# Patient Record
Sex: MALE | Race: BLACK OR AFRICAN AMERICAN | NOT HISPANIC OR LATINO | URBAN - METROPOLITAN AREA
[De-identification: names, ages, dates, MRNs, and addresses within clinical notes are randomized per-mention and may not be internally consistent; named-entity substitution may affect disease eponyms.]

---

## 2022-06-26 ENCOUNTER — EMERGENCY (EMERGENCY)
Facility: HOSPITAL | Age: 67
LOS: 1 days | Discharge: HOME | End: 2022-06-26
Attending: EMERGENCY MEDICINE
Payer: MEDICARE

## 2022-06-26 VITALS
RESPIRATION RATE: 16 BRPM | TEMPERATURE: 98 F | HEART RATE: 88 BPM | DIASTOLIC BLOOD PRESSURE: 89 MMHG | SYSTOLIC BLOOD PRESSURE: 148 MMHG | OXYGEN SATURATION: 96 %

## 2022-06-26 VITALS
OXYGEN SATURATION: 96 % | RESPIRATION RATE: 18 BRPM | TEMPERATURE: 98 F | HEIGHT: 70 IN | WEIGHT: 289.91 LBS | DIASTOLIC BLOOD PRESSURE: 101 MMHG | SYSTOLIC BLOOD PRESSURE: 176 MMHG | HEART RATE: 98 BPM

## 2022-06-26 PROCEDURE — 99285 EMERGENCY DEPT VISIT HI MDM: CPT

## 2022-06-26 PROCEDURE — 99283 EMERGENCY DEPT VISIT LOW MDM: CPT
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
## 2022-06-26 NOTE — ED PROVIDER NOTE - NS ED ROS FT
Review of Systems:  -General: no fever or chills  -ENT: no congestion, no difficulty swallowing  -Pulmonary: no cough, no shortness of breath  -Cardiac: no chest pain, no palpitations  -Gastrointestinal: no abdominal pain, no nausea, no vomiting, and no diarrhea.  -Genitourinary: no blood or pain with urination  -Musculoskeletal: no back or neck pain  -Skin: no rashes  -Endocrine: No h/o diabetes  -Neurologic: No new weakness or numbness in extremities    All else negative unless otherwise specified elsewhere in this note.

## 2022-06-26 NOTE — ED PROVIDER NOTE - CHIEF COMPLAINT
The patient is a 66y Male complaining of psychiatric evaluation. The patient is a 66y Male presenting to the ED asking for assistance with a safe place to live.
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
## 2022-06-26 NOTE — ED PROVIDER NOTE - CLINICAL SUMMARY MEDICAL DECISION MAKING FREE TEXT BOX
No acute medical or psychiatric issue. Contacted social work and obtained information for subsidized housing and [facility] emergency contact information for shelter assignment/housing.  Patient understands plan for discharge, and is agreeable. No acute medical or psychiatric issue. Contacted social work and obtained information for subsidized housing and [facility] emergency contact information for shelter assignment/housing.  Patient understands plan for discharge, and is agreeable. No acute medical or psychiatric issue. Contacted social work and obtained information for subsidized housing and [facility] emergency contact information for shelter assignment/housing.  Patient understands plan for discharge, and is agreeable.
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
## 2022-06-26 NOTE — ED PROVIDER NOTE - PHYSICAL EXAMINATION
On Physical Exam:  General: well appearing, in NAD, speaking clearly in full sentences and without difficulty; cooperative with exam  HEENT: anicteric sclera, airway patent  Neck: no JVD, no nuchal rigidity  Cardiac: normal s1, s2; RRR; no MGR  Lungs: CTABL  Abdomen: soft nontender/nondistended  Skin: intact, no rash  Extremities: no peripheral edema, no gross deformities  Psych: normal mood/affect, calm/cooperative, answers questions appropriately, does not appear to be responding to internal stimuli

## 2022-06-26 NOTE — ED PROVIDER NOTE - PATIENT PORTAL LINK FT
You can access the FollowMyHealth Patient Portal offered by [facility] by registering at the following website: [email or web address]. By joining [employer]’s FollowMyHealth portal, you will also be able to view your health information using other applications (apps) compatible with our system. You can access the FollowMyHealth Patient Portal offered by [facility] by registering at the following website: [email or web address]. By joining [employer]’s FollowMyHealth portal, you will also be able to view your health information using other applications (apps) compatible with our system. You can access the FollowMyHealth Patient Portal offered by [facility] by registering at the following website: [email or web address]. By joining [employer]’s FollowMyHealth portal, you will also be able to view your health information using other applications (apps) compatible with our system.

## 2022-06-26 NOTE — ED ADULT NURSE NOTE - NSIMPLEMENTINTERV_GEN_ALL_ED
Implemented All Universal Safety Interventions:  [place] to call system. Call bell, personal items and telephone within reach. Instruct patient to call for assistance. Room bathroom lighting operational. Non-slip footwear when patient is off stretcher. Physically safe environment: no spills, clutter or unnecessary equipment. Stretcher in lowest position, wheels locked, appropriate side rails in place. Implemented All Universal Safety Interventions:  [place] to call system. Call bell, personal items and telephone within reach. Instruct patient to call for assistance. Room bathroom lighting operational. Non-slip footwear when patient is off stretcher. Physically safe environment: no spills, clutter or unnecessary equipment. Stretcher in lowest position, wheels locked, appropriate side rails in place. Implemented All Universal Safety Interventions:  [place] to call system. Call bell, personal items and telephone within reach. Instruct patient to call for assistance. Room bathroom lighting operational. Non-slip footwear when patient is off stretcher. Physically safe environment: no spills, clutter or unnecessary equipment. Stretcher in lowest position, wheels locked, appropriate side rails in place.

## 2022-07-08 PROBLEM — Z00.00 ENCOUNTER FOR PREVENTIVE HEALTH EXAMINATION: Status: ACTIVE | Noted: 2022-07-08

## 2022-07-20 ENCOUNTER — APPOINTMENT (OUTPATIENT)
Dept: FAMILY MEDICINE | Facility: CLINIC | Age: 67
End: 2022-07-20

## 2022-08-04 ENCOUNTER — APPOINTMENT (OUTPATIENT)
Dept: FAMILY MEDICINE | Facility: CLINIC | Age: 67
End: 2022-08-04

## 2022-08-09 ENCOUNTER — NON-APPOINTMENT (OUTPATIENT)
Age: 67
End: 2022-08-09

## 2022-08-09 ENCOUNTER — APPOINTMENT (OUTPATIENT)
Dept: FAMILY MEDICINE | Facility: CLINIC | Age: 67
End: 2022-08-09

## 2022-08-09 VITALS
WEIGHT: 268.13 LBS | TEMPERATURE: 97.3 F | SYSTOLIC BLOOD PRESSURE: 150 MMHG | DIASTOLIC BLOOD PRESSURE: 32 MMHG | OXYGEN SATURATION: 95 % | HEIGHT: 77 IN | HEART RATE: 75 BPM | BODY MASS INDEX: 31.66 KG/M2

## 2022-08-09 DIAGNOSIS — A04.8 OTHER SPECIFIED BACTERIAL INTESTINAL INFECTIONS: ICD-10-CM

## 2022-08-09 DIAGNOSIS — H91.90 UNSPECIFIED HEARING LOSS, UNSPECIFIED EAR: ICD-10-CM

## 2022-08-09 DIAGNOSIS — I82.409 ACUTE EMBOLISM AND THROMBOSIS OF UNSPECIFIED DEEP VEINS OF UNSPECIFIED LOWER EXTREMITY: ICD-10-CM

## 2022-08-09 DIAGNOSIS — I10 ESSENTIAL (PRIMARY) HYPERTENSION: ICD-10-CM

## 2022-08-09 PROCEDURE — 99204 OFFICE O/P NEW MOD 45 MIN: CPT | Mod: 25

## 2022-08-09 PROCEDURE — 93000 ELECTROCARDIOGRAM COMPLETE: CPT

## 2022-08-09 RX ORDER — LOSARTAN POTASSIUM AND HYDROCHLOROTHIAZIDE 12.5; 1 MG/1; MG/1
100-12.5 TABLET ORAL
Qty: 90 | Refills: 1 | Status: ACTIVE | COMMUNITY
Start: 1900-01-01 | End: 1900-01-01

## 2022-08-09 RX ORDER — PANTOPRAZOLE 40 MG/1
40 TABLET, DELAYED RELEASE ORAL
Refills: 0 | Status: ACTIVE | COMMUNITY

## 2022-08-09 RX ORDER — RIVAROXABAN 20 MG/1
20 TABLET, FILM COATED ORAL DAILY
Qty: 1 | Refills: 0 | Status: ACTIVE | COMMUNITY
Start: 1900-01-01 | End: 1900-01-01

## 2022-08-09 RX ORDER — AMOXICILLIN 500 MG/1
500 TABLET, FILM COATED ORAL
Refills: 0 | Status: ACTIVE | COMMUNITY

## 2022-08-09 RX ORDER — CHLORHEXIDINE GLUCONATE 4 %
1000 LIQUID (ML) TOPICAL
Refills: 0 | Status: ACTIVE | COMMUNITY

## 2022-08-09 RX ORDER — RIVAROXABAN 15 MG/1
15 TABLET, FILM COATED ORAL
Refills: 0 | Status: DISCONTINUED | COMMUNITY
End: 2022-08-09

## 2022-08-09 RX ORDER — CLARITHROMYCIN 500 MG/1
500 TABLET, FILM COATED ORAL
Refills: 0 | Status: ACTIVE | COMMUNITY

## 2022-08-09 NOTE — ASSESSMENT
[FreeTextEntry1] : Reviewed hospital records \par After discussed pt needs office [place] due to transportation issues. \par will give # for primary care at [facility] for patient to make followup appt

## 2022-08-09 NOTE — PHYSICAL EXAM
[Normal] : normal rate, regular rhythm, normal S1 and S2 and no murmur heard [de-identified] : hard of hearing

## 2022-08-09 NOTE — HISTORY OF PRESENT ILLNESS
[de-identified] : Pt presenting to establish care. \par Pt was seen at [facility] on 7/13/22\par Was diagnosed with Right leg DVT-> started on Xarelto 20 mg \par Needs to see CArdio \par \par Diagnosed with H pylori - on  abx \par Works for [employer]-> retired from there \par \par HTN:\par doing well\par needs refill of medication\par

## 2023-01-19 ENCOUNTER — EMERGENCY (EMERGENCY)
Facility: HOSPITAL | Age: 68
LOS: 1 days | Discharge: HOME | End: 2023-01-19
Attending: STUDENT IN AN ORGANIZED HEALTH CARE EDUCATION/TRAINING PROGRAM
Payer: MEDICARE

## 2023-01-19 VITALS
OXYGEN SATURATION: 97 % | HEART RATE: 79 BPM | SYSTOLIC BLOOD PRESSURE: 199 MMHG | DIASTOLIC BLOOD PRESSURE: 101 MMHG | RESPIRATION RATE: 18 BRPM | TEMPERATURE: 98 F

## 2023-01-19 LAB
ALBUMIN SERPL ELPH-MCNC: 3.8 G/DL — SIGNIFICANT CHANGE UP (ref 3.3–5.2)
ALP SERPL-CCNC: 97 U/L — SIGNIFICANT CHANGE UP (ref 40–120)
ALT FLD-CCNC: 21 U/L — SIGNIFICANT CHANGE UP
AMMONIA BLD-MCNC: 52 UMOL/L — SIGNIFICANT CHANGE UP (ref 11–55)
AMPHET UR-MCNC: NEGATIVE — SIGNIFICANT CHANGE UP
ANION GAP SERPL CALC-SCNC: 10 MMOL/L — SIGNIFICANT CHANGE UP (ref 5–17)
APAP SERPL-MCNC: <3 UG/ML — LOW (ref 10–26)
APPEARANCE UR: CLEAR — SIGNIFICANT CHANGE UP
APTT BLD: 33.4 SEC — SIGNIFICANT CHANGE UP (ref 27.5–35.5)
AST SERPL-CCNC: 21 U/L — SIGNIFICANT CHANGE UP
BARBITURATES UR SCN-MCNC: NEGATIVE — SIGNIFICANT CHANGE UP
BASOPHILS # BLD AUTO: 0.04 K/UL — SIGNIFICANT CHANGE UP (ref 0–0.2)
BASOPHILS NFR BLD AUTO: 0.5 % — SIGNIFICANT CHANGE UP (ref 0–2)
BENZODIAZ UR-MCNC: NEGATIVE — SIGNIFICANT CHANGE UP
BILIRUB SERPL-MCNC: 0.6 MG/DL — SIGNIFICANT CHANGE UP (ref 0.4–2)
BILIRUB UR-MCNC: NEGATIVE — SIGNIFICANT CHANGE UP
BUN SERPL-MCNC: 14.2 MG/DL — SIGNIFICANT CHANGE UP (ref 8–20)
CALCIUM SERPL-MCNC: 8.9 MG/DL — SIGNIFICANT CHANGE UP (ref 8.4–10.5)
CHLORIDE SERPL-SCNC: 103 MMOL/L — SIGNIFICANT CHANGE UP (ref 96–108)
CO2 SERPL-SCNC: 24 MMOL/L — SIGNIFICANT CHANGE UP (ref 22–29)
COCAINE METAB.OTHER UR-MCNC: NEGATIVE — SIGNIFICANT CHANGE UP
COLOR SPEC: YELLOW — SIGNIFICANT CHANGE UP
CREAT SERPL-MCNC: 0.67 MG/DL — SIGNIFICANT CHANGE UP (ref 0.5–1.3)
DIFF PNL FLD: NEGATIVE — SIGNIFICANT CHANGE UP
EGFR: 102 ML/MIN/1.73M2 — SIGNIFICANT CHANGE UP
EOSINOPHIL # BLD AUTO: 0.16 K/UL — SIGNIFICANT CHANGE UP (ref 0–0.5)
EOSINOPHIL NFR BLD AUTO: 1.9 % — SIGNIFICANT CHANGE UP (ref 0–6)
ETHANOL SERPL-MCNC: <10 MG/DL — SIGNIFICANT CHANGE UP (ref 0–9)
FLUAV AG NPH QL: SIGNIFICANT CHANGE UP
FLUBV AG NPH QL: SIGNIFICANT CHANGE UP
GLUCOSE SERPL-MCNC: 86 MG/DL — SIGNIFICANT CHANGE UP (ref 70–99)
GLUCOSE UR QL: NEGATIVE MG/DL — SIGNIFICANT CHANGE UP
HCT VFR BLD CALC: 41.3 % — SIGNIFICANT CHANGE UP (ref 39–50)
HGB BLD-MCNC: 13.5 G/DL — SIGNIFICANT CHANGE UP (ref 13–17)
IMM GRANULOCYTES NFR BLD AUTO: 0.2 % — SIGNIFICANT CHANGE UP (ref 0–0.9)
KETONES UR-MCNC: NEGATIVE — SIGNIFICANT CHANGE UP
LEUKOCYTE ESTERASE UR-ACNC: NEGATIVE — SIGNIFICANT CHANGE UP
LYMPHOCYTES # BLD AUTO: 2.92 K/UL — SIGNIFICANT CHANGE UP (ref 1–3.3)
LYMPHOCYTES # BLD AUTO: 35 % — SIGNIFICANT CHANGE UP (ref 13–44)
MCHC RBC-ENTMCNC: 31.1 PG — SIGNIFICANT CHANGE UP (ref 27–34)
MCHC RBC-ENTMCNC: 32.7 GM/DL — SIGNIFICANT CHANGE UP (ref 32–36)
MCV RBC AUTO: 95.2 FL — SIGNIFICANT CHANGE UP (ref 80–100)
METHADONE UR-MCNC: NEGATIVE — SIGNIFICANT CHANGE UP
MONOCYTES # BLD AUTO: 0.8 K/UL — SIGNIFICANT CHANGE UP (ref 0–0.9)
MONOCYTES NFR BLD AUTO: 9.6 % — SIGNIFICANT CHANGE UP (ref 2–14)
NEUTROPHILS # BLD AUTO: 4.4 K/UL — SIGNIFICANT CHANGE UP (ref 1.8–7.4)
NEUTROPHILS NFR BLD AUTO: 52.8 % — SIGNIFICANT CHANGE UP (ref 43–77)
NITRITE UR-MCNC: NEGATIVE — SIGNIFICANT CHANGE UP
NT-PROBNP SERPL-SCNC: 46 PG/ML — SIGNIFICANT CHANGE UP (ref 0–300)
OPIATES UR-MCNC: NEGATIVE — SIGNIFICANT CHANGE UP
PCP SPEC-MCNC: SIGNIFICANT CHANGE UP
PCP UR-MCNC: NEGATIVE — SIGNIFICANT CHANGE UP
PH UR: 6 — SIGNIFICANT CHANGE UP (ref 5–8)
PLATELET # BLD AUTO: 287 K/UL — SIGNIFICANT CHANGE UP (ref 150–400)
POTASSIUM SERPL-MCNC: 3.9 MMOL/L — SIGNIFICANT CHANGE UP (ref 3.5–5.3)
POTASSIUM SERPL-SCNC: 3.9 MMOL/L — SIGNIFICANT CHANGE UP (ref 3.5–5.3)
PROT SERPL-MCNC: 7.1 G/DL — SIGNIFICANT CHANGE UP (ref 6.6–8.7)
PROT UR-MCNC: NEGATIVE — SIGNIFICANT CHANGE UP
RBC # BLD: 4.34 M/UL — SIGNIFICANT CHANGE UP (ref 4.2–5.8)
RBC # FLD: 12 % — SIGNIFICANT CHANGE UP (ref 10.3–14.5)
RSV RNA NPH QL NAA+NON-PROBE: SIGNIFICANT CHANGE UP
SALICYLATES SERPL-MCNC: <0.6 MG/DL — LOW (ref 10–20)
SARS-COV-2 RNA SPEC QL NAA+PROBE: SIGNIFICANT CHANGE UP
SODIUM SERPL-SCNC: 136 MMOL/L — SIGNIFICANT CHANGE UP (ref 135–145)
SP GR SPEC: 1.01 — SIGNIFICANT CHANGE UP (ref 1.01–1.02)
THC UR QL: NEGATIVE — SIGNIFICANT CHANGE UP
TROPONIN T SERPL-MCNC: <0.01 NG/ML — SIGNIFICANT CHANGE UP (ref 0–0.06)
UROBILINOGEN FLD QL: NEGATIVE MG/DL — SIGNIFICANT CHANGE UP
WBC # BLD: 8.34 K/UL — SIGNIFICANT CHANGE UP (ref 3.8–10.5)
WBC # FLD AUTO: 8.34 K/UL — SIGNIFICANT CHANGE UP (ref 3.8–10.5)

## 2023-01-19 PROCEDURE — 93970 EXTREMITY STUDY: CPT | Mod: 26

## 2023-01-19 PROCEDURE — 70450 CT HEAD/BRAIN W/O DYE: CPT | Mod: 26,MA

## 2023-01-19 PROCEDURE — 93010 ELECTROCARDIOGRAM REPORT: CPT

## 2023-01-19 PROCEDURE — 71045 X-RAY EXAM CHEST 1 VIEW: CPT | Mod: 26

## 2023-01-19 PROCEDURE — 99285 EMERGENCY DEPT VISIT HI MDM: CPT

## 2023-01-19 RX ORDER — METOPROLOL TARTRATE 50 MG
50 TABLET ORAL ONCE
Refills: 0 | Status: COMPLETED | OUTPATIENT
Start: 2023-01-19 | End: 2023-01-19

## 2023-01-19 RX ORDER — NIFEDIPINE 30 MG
60 TABLET, EXTENDED RELEASE 24 HR ORAL ONCE
Refills: 0 | Status: COMPLETED | OUTPATIENT
Start: 2023-01-19 | End: 2023-01-19

## 2023-01-19 RX ADMIN — Medication 60 MILLIGRAM(S): at 15:23

## 2023-01-19 RX ADMIN — Medication 50 MILLIGRAM(S): at 15:23

## 2023-01-19 NOTE — ED ADULT NURSE NOTE - NS ED NURSE AMBULANCES2
[facility] Ambulance Service [facility] Ambulance Service [facility] Ambulance Service [facility] Ambulance Service [facility]

## 2023-01-19 NOTE — ED PROVIDER NOTE - OBJECTIVE STATEMENT
Pt is a 68yo man with PMH of hypertension, DVTs, currently homeless not receiving medications, presenting today for a high blood pressure reading to 199/100. Pt is currently homeless for the past month and has not been receiving his medications.Currently denies headache, vision changes, n/v. Pt is currently endorsing bilateral leg swelling and tenderness. He denies SOB, cough, chest pain, fever. Of note, pt is also endorsing hearing voices from bluetooth devices in his ears and that someone is watching him through his eyes. He was previously prescribed nifedipine 60, metoprolol 50, HCTZ 25 - losartan 100,and xarelto 20 for HTN and DVTs. Pt is a 66yo man with PMH of hypertension, DVTs, currently homeless not receiving medications, presenting today for a high blood pressure reading to 199/100. Pt is currently homeless for the past month and has not been receiving his medications.Currently denies headache, vision changes, n/v. Pt is currently endorsing bilateral leg swelling and tenderness. He denies SOB, cough, chest pain, fever. Of note, pt is also endorsing hearing voices from bluetooth devices in his ears and that someone is watching him through his eyes. He was previously prescribed nifedipine 60, metoprolol 50, HCTZ 25 - losartan 100,and xarelto 20 for HTN and DVTs.

## 2023-01-19 NOTE — ED PROVIDER NOTE - PHYSICAL EXAMINATION
Constitutional: laying comfortably,  no acute distress  HENMT: Normocephalic, atraumatic  Respiratory: Lungs clear to ausculation bilaterally. No wheezes, stridor, or crackles. No tachypnea or increased work of breathing  Cardiovascular: Normal rate, regular rhythm, normal S1 and S2  Gastrointestinal: Abdomen soft, non-distended, non-tender, intact bowel sounds  Neurological: Cranial nerves grossly intact. No focal deficits. Appears at baseline  Musculoskeletal: Moves all extremities spontaneously without limitation. 2+ non-pitting edema in lower extremities bilaterally, LLE TTP and warm

## 2023-01-19 NOTE — ED ADULT NURSE NOTE - OBJECTIVE STATEMENT
Pt care acquired at 1420. Pt is A&Ox4. Pt states he was brought in due to htn and swelling in lower extremities, but denies complaints at this time. Pt is enrolled in the DASH program due to htn but hasn't been able to take medication for two weeks. Pt requesting permission to shower will request permission from MD. Bilateral edema noted in lower extremities.

## 2023-01-19 NOTE — ED ADULT NURSE REASSESSMENT NOTE - NS ED NURSE REASSESS COMMENT FT1
assuming care for this pt from ivan gudino. pt awake and oriented x 4. pt on a yellow gown. pt calm and cooperative. waiting for US of bilateral legs. side rails up for safety. bed  locked to lowest position.

## 2023-01-19 NOTE — ED PROVIDER NOTE - NS ED ROS FT
REVIEW OF SYSTEMS:  CONSTITUTIONAL: No weakness, fevers or chills  EYES/ENT: No visual changes;  No vertigo or throat pain   NECK: No pain or stiffness  RESPIRATORY: No cough, wheezing, hemoptysis; No shortness of breath  CARDIOVASCULAR: No chest pain or palpitations  GASTROINTESTINAL: No abdominal or epigastric pain. No nausea, vomiting, or hematemesis; No diarrhea or constipation. No melena or hematochezia.  GENITOURINARY: No dysuria, frequency or hematuria  NEUROLOGICAL: No numbness or weakness  SKIN: No itching, dryness, or rashes  MSK: + bilateral leg swelling

## 2023-01-19 NOTE — ED PROVIDER NOTE - ATTENDING CONTRIBUTION TO CARE
I personally saw the patient with the resident, and completed the key components of the history and physical exam. I then discussed the management plan with the resident.     I personally saw the patient with the medical student as well, and completed the key components of the history and physical exam. I then discussed the management plan with the resident and medical student

## 2023-01-19 NOTE — ED PROVIDER NOTE - NS ED MD DISPO DIVISION OBS
[facility]

## 2023-01-19 NOTE — ED ADULT NURSE NOTE - NSIMPLEMENTINTERV_GEN_ALL_ED
Implemented All Universal Safety Interventions:  [place] to call system. Call bell, personal items and telephone within reach. Instruct patient to call for assistance. Room bathroom lighting operational. Non-slip footwear when patient is off stretcher. Physically safe environment: no spills, clutter or unnecessary equipment. Stretcher in lowest position, wheels locked, appropriate side rails in place. Implemented All Universal Safety Interventions:  [place] to call system. Call bell, personal items and telephone within reach. Instruct patient to call for assistance. Room bathroom lighting operational. Non-slip footwear when patient is off stretcher. Physically safe environment: no spills, clutter or unnecessary equipment. Stretcher in lowest position, wheels locked, appropriate side rails in place. Implemented All Universal Safety Interventions:  [place] to call system. Call bell, personal items and telephone within reach. Instruct patient to call for assistance. Room bathroom lighting operational. Non-slip footwear when patient is off stretcher. Physically safe environment: no spills, clutter or unnecessary equipment. Stretcher in lowest position, wheels locked, appropriate side rails in place. Implemented All Universal Safety Interventions:  [place] to call system. Call bell, personal items and telephone within reach. Instruct patient to call for assistance. Room bathroom lighting operational. Non-slip footwear when patient is off stretcher. Physically safe environment: no spills, clutter or unnecessary equipment. Stretcher in lowest position, wheels locked, appropriate side rails in place. Implemented All Universal Safety Interventions:  [place] to call system. Call bell, personal items and telephone within reach. Instruct patient to call for assistance. Room bathroom lighting operational. Non-slip footwear when patient is off stretcher. Physically safe environment: no spills, clutter or unnecessary equipment. Stretcher in lowest position, wheels locked, appropriate side rails in place.

## 2023-01-19 NOTE — ED ADULT NURSE REASSESSMENT NOTE - NS ED NURSE REASSESS COMMENT FT1
Pt was assisted to shower, and tolerated well. Pt returned to bed in stable condition.  Pt awaiting CT and sono at this time

## 2023-01-19 NOTE — ED ADULT NURSE NOTE - CAS TRG GEN SKIN CONDITION
1/5/2017          Griselad Murry  [address]  [place] [address]    Dear Griselda:    [facility] wants to ensure your discharge home is safe and you or your loved ones have had all your questions answered regarding your care after you leave the hospital.    You may receive a telephone call within two days of your discharge.  This call is to make certain you understand your discharge instructions as well as ensure we provided you with the best care possible during your stay with us.     The call will only last approximately 3-5 minutes and will be done by a nurse.    Once again, we want to ensure your discharge home is safe and that you have a clear understanding of any next steps in your care.  If you have any questions or concerns, please do not hesitate to contact us, we are here for you.  Thank you for choosing [facility] for your healthcare needs.    Sincerely,    Kristopher Rojas    [facility]         Warm/Dry

## 2023-01-19 NOTE — ED ADULT TRIAGE NOTE - NS ED NURSE AMBULANCES
[facility]

## 2023-01-19 NOTE — ED ADULT TRIAGE NOTE - CHIEF COMPLAINT QUOTE
67M nad aaox3 biba from outreach program DASH for hypertension. Pt denies HA, denies visual changes, denies CP, denies SOB. Pt reports his ears bother him due to having bluetooth implanted and voices narrate what he is doing and connect to devices which he then sees. Denies SI, denies HI, needs housing.

## 2023-01-20 DIAGNOSIS — F29 UNSPECIFIED PSYCHOSIS NOT DUE TO A SUBSTANCE OR KNOWN PHYSIOLOGICAL CONDITION: ICD-10-CM

## 2023-01-20 PROCEDURE — 90792 PSYCH DIAG EVAL W/MED SRVCS: CPT

## 2023-01-20 PROCEDURE — 99223 1ST HOSP IP/OBS HIGH 75: CPT

## 2023-01-20 RX ORDER — ACETAMINOPHEN 500 MG
650 TABLET ORAL ONCE
Refills: 0 | Status: COMPLETED | OUTPATIENT
Start: 2023-01-20 | End: 2023-01-20

## 2023-01-20 RX ADMIN — Medication 650 MILLIGRAM(S): at 02:28

## 2023-01-20 RX ADMIN — Medication 650 MILLIGRAM(S): at 03:35
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
## 2023-01-20 NOTE — ED CDU PROVIDER INITIAL DAY NOTE - OBJECTIVE STATEMENT
Pt is a 66yo man with PMH of hypertension, DVTs, currently homeless not receiving medications, presenting today for a high blood pressure reading to 199/100. Pt is currently homeless for the past month and has not been receiving his medications.Currently denies headache, vision changes, n/v. Pt is currently endorsing bilateral leg swelling and tenderness. He denies SOB, cough, chest pain, fever. Of note, pt is also endorsing hearing voices from bluetooth devices in his ears and that someone is watching him through his eyes. He was previously prescribed nifedipine 60, metoprolol 50, HCTZ 25 - losartan 100,and xarelto 20 for HTN and DVTs. Pt is a 68yo man with PMH of hypertension, DVTs, currently homeless not receiving medications, presenting today for a high blood pressure reading to 199/100. Pt is currently homeless for the past month and has not been receiving his medications.Currently denies headache, vision changes, n/v. Pt is currently endorsing bilateral leg swelling and tenderness. He denies SOB, cough, chest pain, fever. Of note, pt is also endorsing hearing voices from bluetooth devices in his ears and that someone is watching him through his eyes. He was previously prescribed nifedipine 60, metoprolol 50, HCTZ 25 - losartan 100,and xarelto 20 for HTN and DVTs.

## 2023-01-20 NOTE — ED BEHAVIORAL HEALTH ASSESSMENT NOTE - DESCRIPTION
denies leg edema /pain from running , wife  2 yrs ago, retired, has adult children T(C): 36.7 (01-20-23 @ 16:07), Max: 36.8 (01-20-23 @ 01:27)  T(F): 98 (01-20-23 @ 16:07), Max: 98.3 (01-20-23 @ 01:27)  HR: 88 (01-20-23 @ 18:51) (88 - 113)  BP: 150/99 (01-20-23 @ 16:07) (129/66 - 150/99)  RR: 18 (01-20-23 @ 18:51) (17 - 18)  SpO2: 98% (01-20-23 @ 18:51) (96% - 98%)
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
## 2023-01-20 NOTE — ED ADULT NURSE REASSESSMENT NOTE - NS ED NURSE REASSESS COMMENT FT1
Pt resting comfortable in stretcher. no c/o pain or discomfort, RR even and unlabored, safety maintained

## 2023-01-20 NOTE — ED ADULT NURSE REASSESSMENT NOTE - NS ED NURSE REASSESS COMMENT FT1
Pt remains alert and oriented X 3.  Pt tolerating meals all day today and sleeping on stretcher.  He Intermittently talks about people filming him, stealing things from him  and able to hear his thoughts.  Dr. Fermin at bedside and is aware.  Awaiting psych evaluation.

## 2023-01-20 NOTE — ED BEHAVIORAL HEALTH ASSESSMENT NOTE - SUMMARY
61 yo AA male, homeless since June, on the move [place] and now back in [place], [facility] transferred from [facility] for "housing", found to be paranoid and referred to psych.  No formal PPH, tx, psych admissions, suicide attempt, no drug or alcohol use, recent [place] admission for leg pain (from running and walking), denies PMH, referred for paranoid ideation.     Pt claims he hears someone talking about him through his blue-tooth in ear.  He claims is the voices of Adria and Gena and they are trying to kill him.  He also claims they take pictures through his eyes and can see everything he does.    Pt is an imminent danger to self and others due to psychosis and paranoid ideation and requires in pt psych admission for safety and stabilization. 61 yo AA male, homeless since June, on the move from [place], [place] and now back in [place], [facility] transferred from [facility] for "housing", found to be paranoid and referred to psych.  No formal PPH, tx, psych admissions, suicide attempt, no drug or alcohol use, recent [place] admission for leg pain (from running and walking), denies PMH, referred for paranoid ideation.     Pt claims he hears someone talking about him through his blue-tooth in ear.  He claims is the voices of Adria and Gena and they are trying to kill him.  He also claims they take pictures through his eyes and can see everything he does.    Pt is an imminent danger to self and others due to psychosis and paranoid ideation and requires in pt psych admission for safety and stabilization. 59 yo AA male, homeless since June, on the move [place] and now back in [place], [facility] transferred from [facility] for "housing", found to be paranoid and referred to psych.  No formal PPH, tx, psych admissions, suicide attempt, no drug or alcohol use, recent [place] admission for leg pain (from running and walking), denies PMH, referred for paranoid ideation.     Pt claims he hears someone talking about him through his blue-tooth in ear.  He claims is the voices of Adria and Gena and they are trying to kill him.  He also claims they take pictures through his eyes and can see everything he does.    Pt is an imminent danger to self and others due to psychosis and paranoid ideation and requires in pt psych admission for safety and stabilization. 61 yo AA male, homeless since June, [place] the [place] now back in [place], [facility] transferred from [facility] for "housing", found to be paranoid and referred to psych.  No formal PPH, tx, psych admissions, suicide attempt, no drug or alcohol use, recent [place] admission for leg pain (from running and walking), denies PMH, referred for paranoid ideation.     Pt claims he hears someone talking about him through his blue-tooth in ear.  He claims is the voices of Adria and Gena and they are trying to kill him.  He also claims they take pictures through his eyes and can see everything he does.    Pt is an imminent danger to self and others due to psychosis and paranoid ideation and requires in pt psych admission for safety and stabilization. 61 yo AA male, homeless since June, on the move from [place], [place] and now back in [place], [facility] transferred from [facility] for "housing", found to be paranoid and referred to psych.  No formal PPH, tx, psych admissions, suicide attempt, no drug or alcohol use, recent [place] admission for leg pain (from running and walking), denies PMH, referred for paranoid ideation.     Pt claims he hears someone talking about him through his blue-tooth in ear.  He claims is the voices of Adria and Gena and they are trying to kill him.  He also claims they take pictures through his eyes and can see everything he does.    Pt is an imminent danger to self and others due to psychosis and paranoid ideation and requires in pt psych admission for safety and stabilization.
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
## 2023-01-20 NOTE — ED ADULT NURSE REASSESSMENT NOTE - NS ED NURSE REASSESS COMMENT FT1
Assumed care of pt from previous RN. A/O x4. Respirations are even and unlabored on room air. Pt offers no complaints of pain or discomfort at this time. Pt states he is hearing voices and believes there is a bluetooth speaker in his ears and he is being watched. Psych MD at bedside for further evaluation.

## 2023-01-20 NOTE — CHART NOTE - NSCHARTNOTEFT_GEN_A_CORE
JIMMIE note: SW intern made follow up call to [facility] for emergency housing for pt. DSS worker stated that there is no update however pt is on the queue and to call back after 4:30 PM. SW to follow. JIMMIE note: SW intern made follow up call to [facility] for emergency housing for pt. DSS worker stated that there is no update however pt is on the queue and to call back after 4:30 PM. SW to follow. JIMMIE note: SW intern made follow up call to [facility] for emergency housing for pt. DSS worker stated that there is no update however pt is on the queue and to call back after 4:30 PM. SW to follow. JIMMIE note: SW intern made follow up call to [facility] for emergency housing for pt. DSS worker stated that there is no update however pt is on the queue and to call back after 4:30 PM. SW to follow. JIMMIE note: SW intern made follow up call to [facility] for emergency housing for pt. DSS worker stated that there is no update however pt is on the queue and to call back after 4:30 PM. SW to follow.

## 2023-01-20 NOTE — CHART NOTE - NSCHARTNOTEFT_GEN_A_CORE
JIMMIENote: pt seen by behavioral NP(Eric) found to benefit from inpatient psych hospitalization /transfer invol status.  No bed available this evening .SW will continue to follow in the am .
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
## 2023-01-20 NOTE — ED BEHAVIORAL HEALTH ASSESSMENT NOTE - NSACTIVEVENT_PSY_ALL_CORE
Triggering events leading to humiliation, shame, and/or despair (e.g., Loss of relationship, financial or health status) (real or anticipated)/Current or pending social isolation/Chronic pain or other acute medical condition/Pending incarceration or homelessness/Inadequate social supports/Recent onset of psychiatric illness

## 2023-01-20 NOTE — ED BEHAVIORAL HEALTH ASSESSMENT NOTE - NSBHATTESTBILLING_PSY_A_CORE
[id number] diagnostic evaluation with medical services [id number] diagnostic evaluation with medical services [id number] diagnostic evaluation with medical services [id number] diagnostic evaluation with medical services [id number] diagnostic evaluation with medical services

## 2023-01-21 VITALS — HEIGHT: 66 IN | WEIGHT: 289.91 LBS

## 2023-01-21 PROCEDURE — 82140 ASSAY OF AMMONIA: CPT

## 2023-01-21 PROCEDURE — G0378: CPT

## 2023-01-21 PROCEDURE — 80307 DRUG TEST PRSMV CHEM ANLYZR: CPT

## 2023-01-21 PROCEDURE — 99238 HOSP IP/OBS DSCHRG MGMT 30/<: CPT

## 2023-01-21 PROCEDURE — 93005 ELECTROCARDIOGRAM TRACING: CPT

## 2023-01-21 PROCEDURE — 71045 X-RAY EXAM CHEST 1 VIEW: CPT

## 2023-01-21 PROCEDURE — 83880 ASSAY OF NATRIURETIC PEPTIDE: CPT

## 2023-01-21 PROCEDURE — 81003 URINALYSIS AUTO W/O SCOPE: CPT

## 2023-01-21 PROCEDURE — 87637 SARSCOV2&INF A&B&RSV AMP PRB: CPT

## 2023-01-21 PROCEDURE — 85025 COMPLETE CBC W/AUTO DIFF WBC: CPT

## 2023-01-21 PROCEDURE — 80053 COMPREHEN METABOLIC PANEL: CPT

## 2023-01-21 PROCEDURE — 36415 COLL VENOUS BLD VENIPUNCTURE: CPT

## 2023-01-21 PROCEDURE — 93970 EXTREMITY STUDY: CPT

## 2023-01-21 PROCEDURE — 99285 EMERGENCY DEPT VISIT HI MDM: CPT | Mod: 25

## 2023-01-21 PROCEDURE — 85730 THROMBOPLASTIN TIME PARTIAL: CPT

## 2023-01-21 PROCEDURE — 70450 CT HEAD/BRAIN W/O DYE: CPT | Mod: MA

## 2023-01-21 PROCEDURE — 84484 ASSAY OF TROPONIN QUANT: CPT

## 2023-01-21 RX ORDER — METOPROLOL TARTRATE 50 MG
50 TABLET ORAL DAILY
Refills: 0 | Status: DISCONTINUED | OUTPATIENT
Start: 2023-01-21 | End: 2023-01-27

## 2023-01-21 RX ORDER — ACETAMINOPHEN 500 MG
650 TABLET ORAL ONCE
Refills: 0 | Status: COMPLETED | OUTPATIENT
Start: 2023-01-21 | End: 2023-01-21

## 2023-01-21 RX ORDER — NIFEDIPINE 30 MG
60 TABLET, EXTENDED RELEASE 24 HR ORAL DAILY
Refills: 0 | Status: DISCONTINUED | OUTPATIENT
Start: 2023-01-21 | End: 2023-01-27

## 2023-01-21 RX ADMIN — Medication 50 MILLIGRAM(S): at 08:22

## 2023-01-21 RX ADMIN — Medication 650 MILLIGRAM(S): at 08:22

## 2023-01-21 RX ADMIN — Medication 60 MILLIGRAM(S): at 08:22

## 2023-01-21 NOTE — CHART NOTE - NSCHARTNOTEFT_GEN_A_CORE
JIMMIE NOTE: Plan is for IP psych invol status, Amy able to accept after review of faxed referral, [facility] Dr[facility] Milligan. [facility] for [facility]. Nurse to Nurse completed. EMS ambulance arrived at 2pm, informed Amy pt could still be accepted as per Aylin SAMUEL. Transport ltr provided. Transport packet completed and provided to Nurse for d/c. JIMMIE NOTE: Plan is for IP psych invol status, Amy able to accept after review of faxed referral, [facility]. Nurse to Nurse completed. EMS ambulance arrived at 2pm, informed Amy pt could still be accepted as per Aylin SAMUEL. Transport ltr provided. Transport packet completed and provided to Nurse for d/c. JIMMIE NOTE: Plan is for IP psych invol status, Amy able to accept after review of faxed referral, [facility] for [facility]. Nurse to Nurse completed. EMS ambulance arrived at 2pm, informed Amy pt could still be accepted as per Aylin SAMUEL. Transport ltr provided. Transport packet completed and provided to Nurse for d/c. JIMMIE NOTE: Plan is for IP psych invol status, Amy able to accept after review of faxed referral, accepting is Dr. Milligan. [facility] notified for transport. Nurse to Nurse completed. EMS ambulance arrived at 2pm, informed Amy pt could still be accepted as per Aylin SAMUEL. Transport ltr provided. Transport packet completed and provided to Nurse for d/c. JIMMIE NOTE: Plan is for IP psych invol status, Amy able to accept after review of faxed referral, [facility] Milligan. [facility]. Nurse to Nurse completed. EMS ambulance arrived at 2pm, informed Amy pt could still be accepted as per Aylin SAMUEL. Transport ltr provided. Transport packet completed and provided to Nurse for d/c.

## 2023-01-21 NOTE — ED CDU PROVIDER DISPOSITION NOTE - CLINICAL COURSE
labs and imaging reviewed. Pt medically cleared. Pt xferred to [facility] for inpatient psych stabilization. case accepted by dr. singh. labs and imaging reviewed. Pt medically cleared. Pt xferred to [facility] for inpatient psych stabilization. case accepted by dr. singh. labs and imaging reviewed. Pt medically cleared. Pt xferred to [facility] for inpatient psych stabilization. case accepted by dr. singh. labs and imaging reviewed. Pt medically cleared. Pt xferred to [facility] for inpatient psych stabilization. case accepted by dr. singh. labs and imaging reviewed. Pt medically cleared. Pt xferred to [facility] for inpatient psych stabilization. case accepted by dr. singh.

## 2023-01-21 NOTE — ED ADULT NURSE REASSESSMENT NOTE - NS ED NURSE REASSESS COMMENT FT1
Report received from off going RN, care of pt assumed at 0730. pt received resting on stretcher, arousable to voice, pt denies any new c/o at this time, pt aware he is waiting for placement. reports he still believes " they are listening to him via blue tooth in his ear" when I scanned his bracelet for morning meds. oob to bathroom with steady gait.

## 2023-01-21 NOTE — ED CDU PROVIDER SUBSEQUENT DAY NOTE - PHYSICAL EXAMINATION
Gen: Well appearing in NAD  Head: NC/AT  Neck: trachea midline  Resp:  No distress  Ext: no deformities  Neuro:  A&O appears non focal  Skin:  Warm and dry as visualized

## 2023-01-21 NOTE — ED ADULT NURSE REASSESSMENT NOTE - NS ED NURSE REASSESS COMMENT FT1
Pt resting comfortably in stretcher. Respirations are even and unlabored on room air. Pt offers no complaints of pain or discomfort at this time. Pt provided with po intake and po fluids.

## 2023-01-21 NOTE — ED ADULT NURSE REASSESSMENT NOTE - NS ED NURSE REASSESS COMMENT FT1
Report given to [facility] for transport to [facility]. Report given to [facility] for transport to [facility] Report given to [facility] for transport to [facility]. Report given to [facility] for transport to [facility]. Report given to [facility] for transport to [facility].

## 2023-01-21 NOTE — ED ADULT NURSE REASSESSMENT NOTE - NS ED NURSE REASSESS COMMENT FT1
Pt resting comfortably in stretcher. Respirations are even and unlabored on room air. Pt awaiting inpatient psych bed at this time. Educated on plan of care and expresses understanding.

## 2023-01-21 NOTE — ED ADULT NURSE REASSESSMENT NOTE - NS ED NURSE REASSESS COMMENT FT1
pt changed into a yellow gown, underwear and wool hat left on for comfort ( checked prior to keeping), belongings all placed in multiple bags , pt escorted to  with 2 security guards and RN, pt updated on plan of care. questions asked and answered. pt remains calm and cooperative.